# Patient Record
Sex: FEMALE | Race: BLACK OR AFRICAN AMERICAN | Employment: OTHER | URBAN - METROPOLITAN AREA
[De-identification: names, ages, dates, MRNs, and addresses within clinical notes are randomized per-mention and may not be internally consistent; named-entity substitution may affect disease eponyms.]

---

## 2022-07-14 ENCOUNTER — OFFICE VISIT (OUTPATIENT)
Dept: URGENT CARE | Facility: CLINIC | Age: 77
End: 2022-07-14
Payer: COMMERCIAL

## 2022-07-14 VITALS
BODY MASS INDEX: 24.75 KG/M2 | OXYGEN SATURATION: 100 % | WEIGHT: 145 LBS | TEMPERATURE: 99 F | RESPIRATION RATE: 18 BRPM | HEIGHT: 64 IN | DIASTOLIC BLOOD PRESSURE: 89 MMHG | SYSTOLIC BLOOD PRESSURE: 182 MMHG | HEART RATE: 82 BPM
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
## 2022-07-14 DIAGNOSIS — R03.0 ELEVATED BLOOD PRESSURE READING: ICD-10-CM

## 2022-07-14 DIAGNOSIS — M79.609 PARESTHESIA AND PAIN OF RIGHT EXTREMITY: ICD-10-CM

## 2022-07-14 DIAGNOSIS — R20.2 PARESTHESIA AND PAIN OF RIGHT EXTREMITY: ICD-10-CM

## 2022-07-14 PROCEDURE — 99204 OFFICE O/P NEW MOD 45 MIN: CPT | Mod: 25,S$GLB,, | Performed by: FAMILY MEDICINE

## 2022-07-14 PROCEDURE — 96372 THER/PROPH/DIAG INJ SC/IM: CPT | Mod: S$GLB,,, | Performed by: FAMILY MEDICINE

## 2022-07-14 PROCEDURE — 99204 PR OFFICE/OUTPT VISIT, NEW, LEVL IV, 45-59 MIN: ICD-10-PCS | Mod: 25,S$GLB,, | Performed by: FAMILY MEDICINE

## 2022-07-14 PROCEDURE — 96372 PR INJECTION,THERAP/PROPH/DIAG2ST, IM OR SUBCUT: ICD-10-PCS | Mod: S$GLB,,, | Performed by: FAMILY MEDICINE

## 2022-07-14 RX ORDER — TROSPIUM CHLORIDE 20 MG/1
1 TABLET, FILM COATED ORAL NIGHTLY
COMMUNITY
Start: 2022-06-01 | End: 2024-05-31

## 2022-07-14 RX ORDER — ACETAMINOPHEN 500 MG
1 TABLET ORAL DAILY
Qty: 1 EACH | Refills: 0 | Status: SHIPPED | OUTPATIENT
Start: 2022-07-14

## 2022-07-14 RX ORDER — METHOCARBAMOL 500 MG/1
500 TABLET, FILM COATED ORAL 3 TIMES DAILY PRN
Qty: 30 TABLET | Refills: 0 | Status: SHIPPED | OUTPATIENT
Start: 2022-07-14 | End: 2022-07-24

## 2022-07-14 RX ORDER — KETOROLAC TROMETHAMINE 30 MG/ML
15 INJECTION, SOLUTION INTRAMUSCULAR; INTRAVENOUS
Status: COMPLETED | OUTPATIENT
Start: 2022-07-14 | End: 2022-07-14

## 2022-07-14 RX ORDER — HYDROXYUREA 500 MG/1
500 CAPSULE ORAL DAILY
COMMUNITY

## 2022-07-14 RX ADMIN — KETOROLAC TROMETHAMINE 15 MG: 30 INJECTION, SOLUTION INTRAMUSCULAR; INTRAVENOUS at 01:07

## 2022-07-14 NOTE — PROGRESS NOTES
"Subjective:       Patient ID: Bharatiandrey Burns is a 77 y.o. female.    Vitals:  height is 5' 4" (1.626 m) and weight is 65.8 kg (145 lb). Her oral temperature is 98.5 °F (36.9 °C). Her blood pressure is 182/89 (abnormal) and her pulse is 82. Her respiration is 18 and oxygen saturation is 100%.     Chief Complaint: Hand Pain    77 y.o female presents today c/o R hand pain that started two days ago, pain radiates to her R elbow and R arm and R upper back.  Patient reports that pain is severe and caused her to vomit.  Patient denies injury to R hand but reports that has been cleaning a lot.     She has an extensive history of spine issues and neuropathy. Resides in [place] and follows with specialist.    Pain  This is a new problem. The current episode started in the past 7 days. Associated symptoms include nausea and numbness. Pertinent negatives include no chest pain, congestion, coughing, fatigue, fever, joint swelling or weakness. Nothing aggravates the symptoms. She has tried acetaminophen (Ibuprofen) for the symptoms.       Constitution: Negative for fatigue and fever.   HENT: Negative for congestion.    Neck: Negative for neck swelling.   Cardiovascular: Negative for chest pain, leg swelling and palpitations.   Respiratory: Negative for cough and shortness of breath.    Gastrointestinal: Positive for nausea.   Musculoskeletal: Positive for pain. Negative for joint swelling.   Skin: Negative for color change.   Neurological: Positive for numbness. Negative for speech difficulty.   Psychiatric/Behavioral: Negative for confusion and agitation.       Objective:       Vitals:    07/14/22 1320   BP: (!) 182/89   Pulse: 82   Resp: 18   Temp: 98.5 °F (36.9 °C)   TempSrc: Oral   SpO2: 100%   Weight: 65.8 kg (145 lb)   Height: 5' 4" (1.626 m)     Physical Exam   Constitutional: She is oriented to person, place, and time.  Non-toxic appearance. She does not appear ill. No distress.   HENT:   Head: Atraumatic. "   Eyes: Conjunctivae are normal.   Cardiovascular: Normal rate, regular rhythm, normal heart sounds and normal pulses.   Pulmonary/Chest: Effort normal and breath sounds normal.   Musculoskeletal:         General: No swelling, tenderness or deformity.   Neurological: She is alert and oriented to person, place, and time. A sensory deficit (right forearm C6 with numbness) is present.   Skin: Skin is not diaphoretic.   Psychiatric: Judgment and thought content normal.         Assessment:       1. Overuse injury    2. Paresthesia and pain of right extremity    3. Elevated blood pressure reading          Plan:         1. Overuse injury  -     ketorolac injection 15 mg  -     methocarbamoL (ROBAXIN) 500 MG Tab; Take 1 tablet (500 mg total) by mouth 3 (three) times daily as needed (pain).  Dispense: 30 tablet; Refill: 0    2, Paresthesia and pain of right extremity  Follow up with PCP and/or specialist re: discussing Lyrica since she had no response to Gabapentin in the past.    3. Elevated blood pressure reading  -     blood pressure monitor Kit; 1 each by Misc.(Non-Drug; Combo Route) route once daily at 6am.  Dispense: 1 each; Refill: 0  She has no history of elevated blood pressure. BP may be elevated due to pain. If no improvement will need to follow up with primary care. Seek ED care if any alarm symptoms of stroke or myocardial ischemia.             Paresthesia Discharge Instructions   About this topic   A paresthesia is the feeling of numbness, tingling, or a prickling sensation. This is often a result of pressure on the nerve itself. These nerves control the movement, and normal sensations of your fingers, hands, arms, feet, or legs. Paresthesias can also appear in other parts of your body. Paresthesias can also be caused by damage to the central nervous system, which includes the brain and spinal cord.  Treatment is based on the cause of paresthesia.  What care is needed at home?   1. You may not be able to feel  hot or cold very well. Be careful with hot surfaces like stoves and ovens. Be sure to cover your numb hand before you use hot machines or devices. Be careful when taking baths or showers. You may burn your hand without knowing it. Be careful with cold weather. You may not feel anything with your numb arm or leg, including fingers and toes. Be sure to cover your limb before you go outside to avoid frostbite.  2. Take extra care with activities. Avoid activities that may injure your numb arm or legs.  ? If you cannot feel your limb, you may stumble, fall, step, or touch harmful objects like nails. Walk on flat, clean areas.  ? If your foot is numb, use caution. Do not walk barefoot. Wear well fitted-shoes to protect your feet from injury.  3. Watch for injuries.   What follow-up care is needed?   Follow up with your primary care provider's office to check on your progress. Be sure to keep these visits.  What drugs may be needed?   Discuss Lyrica with your primary care provider  The doctor may order drugs based on the cause of paresthesia. These may include drugs to:  · Relieve tingling and numbness  · Help with pain  · Decrease numbness  · Help relieve burning feeling  · Treat the underlying cause  What can be done to prevent this health problem?   · Stop drinking beer, wine, and mixed drinks (alcohol).  · Avoid exposure to chemicals.  · Quit smoking and avoid secondhand smoke. If you have problems quitting, ask for help.  · See your doctor regularly and seek treatment for illnesses like high blood cholesterol, high blood sugar, or high blood pressure.  · Keep a healthy weight or lose weight if needed.  When do I need to call the doctor?   Activate the emergency medical system right away if you have signs of a heart attack or stroke. Call 911 in the [place] or [place]. The sooner treatment begins, the better your chances for recovery. Call for emergency help right away if you have:  1. Signs of heart  attack:  ? Chest pain  ? Trouble breathing  ? Fast heartbeat  ? Feeling dizzy  2. Signs of stroke:  ? Sudden numbness or weakness of the face, arm, or leg, especially on one side of the body  ? Sudden confusion, trouble speaking or understanding  ? Sudden trouble seeing in one or both eyes  ? Sudden trouble walking, dizziness, loss of balance or coordination  ? Sudden severe headache with no known cause  Call your doctor if you have:  · Signs of infection. These include a fever of 100.4°F (38°C) or higher, chills, or a wound that will not heal.  · Pale or blue color of the arm or leg  · Numbness of arm or leg that does not go away  · Pain that does not go away, even at rest  · Very bad headache  Teach Back: Helping You Understand   The Teach Back Method helps you understand the information we are giving you. After talking with the staff, tell them in your own words what you were just told. This helps to make sure the staff has covered each thing clearly. It also helps to explain things that may have been a bit confusing. Before going home, make sure you are able to do these:  · I can tell you about my condition.  · I can tell you how I will take extra care when walking and to avoid falling.  · I can tell you what I will do if I have signs of a heart attack or stroke.  · I can tell you what I will do if I have pain behind my calf or swelling, soreness, or redness in my leg.  Where can I learn more?   National [employer] of Neurological Disorders and Stroke  https://www.ninds.nih.gov/Disorders/All-Disorders/Pryjbybzyys-Gennxwdgyyv-Kdzx   Last Reviewed Date   2020-03-23  Consumer Information Use and Disclaimer   This information is not specific medical advice and does not replace information you receive from your health care provider. This is only a brief summary of general information. It does NOT include all information about conditions, illnesses, injuries, tests, procedures, treatments, therapies, discharge  "instructions or life-style choices that may apply to you. You must talk with your health care provider for complete information about your health and treatment options. This information should not be used to decide whether or not to accept your health care providers advice, instructions or recommendations. Only your health care provider has the knowledge and training to provide advice that is right for you.  Copyright   Copyright © 2021 UpToDate, Inc. and its affiliates and/or licensors. All rights reserved.     High Blood Pressure in Adults   The Basics   Written by the doctors and editors at [employer]   What is high blood pressure? -- High blood pressure is a condition that puts you at risk for heart attack, stroke, and kidney disease. It does not usually cause symptoms. But it can be serious.  When your doctor or nurse tells you your blood pressure, they will say 2 numbers. For instance, your doctor or nurse might say that your blood pressure is "130 over 80." The top number is the pressure inside your arteries when your heart is pedro. The bottom number is the pressure inside your arteries when your heart is relaxed.  "Elevated blood pressure" is a term doctors or nurses use as a warning. People with elevated blood pressure do not yet have high blood pressure. But their blood pressure is not as low as it should be for good health.  Many experts define high, elevated, and normal blood pressure as follows:  · High - Top number of 130 or above and/or bottom number of 80 or above  · Elevated - Top number between 120 and 129 and bottom number of 79 or below  · Normal - Top number of 119 or below and bottom number of 79 or below  This information is also in the table (table 1).   How can I lower my blood pressure? -- If your doctor or nurse has prescribed blood pressure medicine, the most important thing you can do is to take it. If it causes side effects, do not just stop taking it. Instead, talk to your doctor " "or nurse about the problems it causes. They might be able to lower your dose or switch you to another medicine. If cost is a problem, mention that too. They might be able to put you on a less expensive medicine. Taking your blood pressure medicine can keep you from having a heart attack or stroke, and it can save your life!  Can I do anything on my own? -- You have a lot of control over your blood pressure. To lower it:  · Lose weight (if you are overweight)  · Choose a diet low in fat and rich in fruits, vegetables, and low-fat dairy products  · Reduce the amount of salt you eat  · Do something active for at least 30 minutes a day on most days of the week  · Cut down on alcohol (if you drink more than 2 alcoholic drinks per day)  It's also a good idea to get a home blood pressure meter. People who check their own blood pressure at home do better at keeping it low and can sometimes even reduce the amount of medicine they take.  All topics are updated as new evidence becomes available and our peer review process is complete.  This topic retrieved from [employer] on: Sep 21, 2021.  Topic 39727 Version 15.0  Release: 29.4.2 - C29.263  © 2021 UpToDate, Inc. and/or its affiliates. All rights reserved.  table 1: Definition of normal and high blood pressure  Level  Top number  Bottom number    High 130 or above 80 or above   Elevated 120 to 129 79 or below   Normal 119 or below 79 or below ·   These definitions are from the American College of Cardiology/American Heart Association. Other expert groups might use slightly different definitions.  · "Elevated blood pressure" is a term doctor or nurses use as a warning. It means you do not yet have high blood pressure, but your blood pressure is not as low as it should be for good health.  Graphic 90813 Version 6.0  Consumer Information Use and Disclaimer   This information is not specific medical advice and does not replace information you receive from your health care provider. " This is only a brief summary of general information. It does NOT include all information about conditions, illnesses, injuries, tests, procedures, treatments, therapies, discharge instructions or life-style choices that may apply to you. You must talk with your health care provider for complete information about your health and treatment options. This information should not be used to decide whether or not to accept your health care provider's advice, instructions or recommendations. Only your health care provider has the knowledge and training to provide advice that is right for you. The use of this information is governed by the [employer] End User License Agreement, available at [email or web address].The use of [employer] content is governed by the [employer] Terms of Use. ©2021 [employer] All rights reserved.  Copyright   © 2021 UpToDate, Inc. and/or its affiliates. All rights reserved.

## 2023-04-11 ENCOUNTER — PATIENT MESSAGE (OUTPATIENT)
Dept: RESEARCH | Facility: HOSPITAL | Age: 78
End: 2023-04-11
Payer: COMMERCIAL

## 2023-05-16 ENCOUNTER — PATIENT MESSAGE (OUTPATIENT)
Dept: RESEARCH | Facility: HOSPITAL | Age: 78
End: 2023-05-16
Payer: COMMERCIAL